# Patient Record
Sex: FEMALE | Race: WHITE | NOT HISPANIC OR LATINO | Employment: FULL TIME | URBAN - METROPOLITAN AREA
[De-identification: names, ages, dates, MRNs, and addresses within clinical notes are randomized per-mention and may not be internally consistent; named-entity substitution may affect disease eponyms.]

---

## 2022-04-29 ENCOUNTER — TRANSFERRED RECORDS (OUTPATIENT)
Dept: HEALTH INFORMATION MANAGEMENT | Facility: CLINIC | Age: 32
End: 2022-04-29
Payer: COMMERCIAL

## 2022-05-03 ENCOUNTER — TRANSFERRED RECORDS (OUTPATIENT)
Dept: HEALTH INFORMATION MANAGEMENT | Facility: CLINIC | Age: 32
End: 2022-05-03
Payer: COMMERCIAL

## 2022-05-06 ENCOUNTER — TRANSFERRED RECORDS (OUTPATIENT)
Dept: HEALTH INFORMATION MANAGEMENT | Facility: CLINIC | Age: 32
End: 2022-05-06
Payer: COMMERCIAL

## 2022-05-06 ENCOUNTER — MEDICAL CORRESPONDENCE (OUTPATIENT)
Dept: HEALTH INFORMATION MANAGEMENT | Facility: CLINIC | Age: 32
End: 2022-05-06
Payer: COMMERCIAL

## 2022-05-09 ENCOUNTER — TRANSCRIBE ORDERS (OUTPATIENT)
Dept: OTHER | Age: 32
End: 2022-05-09

## 2022-05-09 ENCOUNTER — TELEPHONE (OUTPATIENT)
Dept: ORTHOPEDICS | Facility: CLINIC | Age: 32
End: 2022-05-09
Payer: COMMERCIAL

## 2022-05-09 DIAGNOSIS — M79.621 PAIN OF RIGHT UPPER ARM: Primary | ICD-10-CM

## 2022-05-09 NOTE — TELEPHONE ENCOUNTER
[facility] Call Center    Phone Message    May a detailed message be left on voicemail: yes     Reason for Call: Appointment Intake    Referring Provider Name: Dr. Monge   Diagnosis and/or Symptoms: Right Bicep Mass - sarcoma vs lipoma    Pt being referred to Dr. Monge for right bicep mass, sarcoma vs lipoma from Britni Agustin PA-C, [facility].  Pt can be reached at [phone].    Action Taken: Message routed to:  [facility] ([facility] Orthopedics - Dr. Monge     Travel Screening: Not Applicable

## 2022-05-09 NOTE — TELEPHONE ENCOUNTER
5/17 30 min slot with Dr Alice POSADA   5/19 4:30pm Dr Jenkins   5/20 8:30am, 10:30am Gigi   5/23 8am, 10am Saleem  5/24 30 min appt slot with Dr Alice POSADA

## 2022-05-10 ENCOUNTER — TELEPHONE (OUTPATIENT)
Dept: OTHER | Age: 32
End: 2022-05-10
Payer: COMMERCIAL

## 2022-05-10 NOTE — TELEPHONE ENCOUNTER
Action May 10, 2022 11:50 AM ABT   Action Taken Image from [facility] received and resolved to PACS    2:41 PM  Records from [facility] received and sent to HIM for upload     DIAGNOSIS: Pain of right upper arm [M79.621]   APPOINTMENT DATE: 05/20/22   NOTES STATUS DETAILS   OFFICE NOTE from referring provider External: Sheeba 04/29/22: CHENCHO Liang   MEDICATION LIST External: [facility]    LABS     US PACS 05/03/22: US Upper Extremity Soft Tissue

## 2022-05-10 NOTE — TELEPHONE ENCOUNTER
Pt is already schedule in an appropriate time frame. No further action is needed at this time.     Ivette Ibanez ATC

## 2022-05-10 NOTE — TELEPHONE ENCOUNTER
Hello,  I'm with ortho rick.  Pt has an ortho oncology order.  Please review and find an appropriately timed appointment for this patient.  Thank you.

## 2022-05-20 ENCOUNTER — PRE VISIT (OUTPATIENT)
Dept: ORTHOPEDICS | Facility: CLINIC | Age: 32
End: 2022-05-20
Payer: COMMERCIAL

## 2022-05-20 ENCOUNTER — OFFICE VISIT (OUTPATIENT)
Dept: ORTHOPEDICS | Facility: CLINIC | Age: 32
End: 2022-05-20
Payer: COMMERCIAL

## 2022-05-20 VITALS — WEIGHT: 170 LBS | HEIGHT: 64 IN | BODY MASS INDEX: 29.02 KG/M2

## 2022-05-20 DIAGNOSIS — M79.621 PAIN OF RIGHT UPPER ARM: ICD-10-CM

## 2022-05-20 PROCEDURE — 99203 OFFICE O/P NEW LOW 30 MIN: CPT | Performed by: ORTHOPAEDIC SURGERY

## 2022-05-20 NOTE — LETTER
Date:May 20, 2022      Patient was self referred, no letter generated. Do not send.        [facility] Health Information

## 2022-05-20 NOTE — NURSING NOTE
"Reason For Visit:   Chief Complaint   Patient presents with     Consult     Right biceps mass referred by Britni Agustin PA-C at [facility] // first noticed about a month ago and more noticeable now // pt states that two days before noticing the mass, she was painting // she reports that she is currently pregnant       Ht 1.62 m (5' 3.78\")   Wt 77.1 kg (170 lb)   BMI 29.38 kg/m      Pain Assessment  Patient Currently in Pain: Denies (no pain today but can get up to 2)        Jona Tran ATC    "

## 2022-05-20 NOTE — PROGRESS NOTES
This is a 31-year-old woman has noticed right arm mass for a month or so.  Ultrasound was not helpful.  She has not noticed any growth since it was first detected.  She does not have pain in the spot.  She states that there is cancer in her family and she wanted to get it checked out.    Examination she is alert oriented has normal mood and affect and was in no acute distress.  Respirations are regular and unlabored eyes are nonicteric.  The mass is mobile with flexion of the underlying muscles.  Its not tender.  It seems to within the subcutaneous tissues and is about 2 cm in diameter or so.  It is on the medial aspect of the right upper arm.  There is no edema erythema or other masses that I can appreciate in that arm.    I reviewed the ultrasound images and report which were not helpful other than to define the size at about 2 cm.    Plan is to get an MRI.  If it is a lipoma then I would offer her to excise this the time of her choosing.  She is 6 weeks pregnant right now and so certainly could wait till after her pregnancy for her obviously benign tumor.  If it is unclear what it is then we should do a needle biopsy as soon as possible.  This is most safely done in clinic with local anesthesia.  We will give her more direction on a plan once we can see the MRI.  Usually we would get an MRI with contrast but if this is contraindicated with her stage of pregnancy then the MRI could be done without contrast.  I have answered all of her questions today.

## 2022-05-20 NOTE — LETTER
5/20/2022         RE: Mireille Yin  [address] [facility] [address]        Dear Colleague,    Thank you for referring your patient, Mireille Yin, to the [facility] [place]. Please see a copy of my visit note below.    This is a 31-year-old woman has noticed right arm mass for a month or so.  Ultrasound was not helpful.  She has not noticed any growth since it was first detected.  She does not have pain in the spot.  She states that there is cancer in her family and she wanted to get it checked out.    Examination she is alert oriented has normal mood and affect and was in no acute distress.  Respirations are regular and unlabored eyes are nonicteric.  The mass is mobile with flexion of the underlying muscles.  Its not tender.  It seems to within the subcutaneous tissues and is about 2 cm in diameter or so.  It is on the medial aspect of the right upper arm.  There is no edema erythema or other masses that I can appreciate in that arm.    I reviewed the ultrasound images and report which were not helpful other than to define the size at about 2 cm.    Plan is to get an MRI.  If it is a lipoma then I would offer her to excise this the time of her choosing.  She is 6 weeks pregnant right now and so certainly could wait till after her pregnancy for her obviously benign tumor.  If it is unclear what it is then we should do a needle biopsy as soon as possible.  This is most safely done in clinic with local anesthesia.  We will give her more direction on a plan once we can see the MRI.  Usually we would get an MRI with contrast but if this is contraindicated with her stage of pregnancy then the MRI could be done without contrast.  I have answered all of her questions today.      Again, thank you for allowing me to participate in the care of your patient.        Sincerely,        Brent Yu MD

## 2022-05-24 ENCOUNTER — ANCILLARY PROCEDURE (OUTPATIENT)
Dept: MRI IMAGING | Facility: CLINIC | Age: 32
End: 2022-05-24
Attending: ORTHOPAEDIC SURGERY
Payer: COMMERCIAL

## 2022-05-24 DIAGNOSIS — M79.621 PAIN OF RIGHT UPPER ARM: ICD-10-CM

## 2022-05-24 PROCEDURE — 73218 MRI UPPER EXTREMITY W/O DYE: CPT | Mod: RT | Performed by: RADIOLOGY

## 2022-06-01 ENCOUNTER — VIRTUAL VISIT (OUTPATIENT)
Dept: ORTHOPEDICS | Facility: CLINIC | Age: 32
End: 2022-06-01
Payer: COMMERCIAL

## 2022-06-01 DIAGNOSIS — M79.89 SOFT TISSUE MASS: Primary | ICD-10-CM

## 2022-06-01 PROCEDURE — 99213 OFFICE O/P EST LOW 20 MIN: CPT | Mod: TEL | Performed by: ORTHOPAEDIC SURGERY

## 2022-06-01 NOTE — LETTER
Date:June 2, 2022      Provider requested that no letter be sent. Do not send.       [facility]

## 2022-06-01 NOTE — NURSING NOTE
Reason For Visit:   Chief Complaint   Patient presents with     RECHECK     Review recent right humerus MRI       There were no vitals taken for this visit.    Pain Assessment  Patient Currently in Pain: Denies (no pain per pt)    Jona Tran ATC

## 2022-06-01 NOTE — PROGRESS NOTES
Mireille is a 31 year old who is being evaluated via a billable telephone visit.      What phone number would you like to be contacted at? [phone]  How would you like to obtain your AVS? Mail a copy    Phone call duration: 2 minutes    I called this patient today to report the results of her MRI of her forearm tumor.  We were hoping that this would be a lipoma and that she could essentially ignore it during her pregnancy.  Unfortunately the MRI shows a growth that does not have any obvious fat in it.  I am still hopeful that this could be something benign such as a nerve tumor but it the MRI is indeterminate.  I therefore have recommended that the patient come in for a needle biopsy.  This is the plan that we previously discussed.    Over the phone the patient was alert oriented and appropriate and in no acute distress.  She is in agreement with this plan and so we will reach out to her to set up a time and we can do needle biopsy under local anesthetic in clinic very soon.

## 2022-06-01 NOTE — LETTER
6/1/2022         RE: Mireille Yin  [address] [facility] [address]        Dear Colleague,    Thank you for referring your patient, Mireille Yin, to the [facility] [place]. Please see a copy of my visit note below.    Mireille is a 31 year old who is being evaluated via a billable telephone visit.      What phone number would you like to be contacted at? [phone]  How would you like to obtain your AVS? Mail a copy    Phone call duration: 2 minutes    I called this patient today to report the results of her MRI of her forearm tumor.  We were hoping that this would be a lipoma and that she could essentially ignore it during her pregnancy.  Unfortunately the MRI shows a growth that does not have any obvious fat in it.  I am still hopeful that this could be something benign such as a nerve tumor but it the MRI is indeterminate.  I therefore have recommended that the patient come in for a needle biopsy.  This is the plan that we previously discussed.    Over the phone the patient was alert oriented and appropriate and in no acute distress.  She is in agreement with this plan and so we will reach out to her to set up a time and we can do needle biopsy under local anesthetic in clinic very soon.        Again, thank you for allowing me to participate in the care of your patient.        Sincerely,        Brent Yu MD

## 2022-06-03 ENCOUNTER — OFFICE VISIT (OUTPATIENT)
Dept: ORTHOPEDICS | Facility: CLINIC | Age: 32
End: 2022-06-03
Payer: COMMERCIAL

## 2022-06-03 DIAGNOSIS — M79.89 SOFT TISSUE MASS: Primary | ICD-10-CM

## 2022-06-03 PROCEDURE — 88305 TISSUE EXAM BY PATHOLOGIST: CPT | Mod: TC | Performed by: ORTHOPAEDIC SURGERY

## 2022-06-03 PROCEDURE — 88305 TISSUE EXAM BY PATHOLOGIST: CPT | Mod: 26 | Performed by: PATHOLOGY

## 2022-06-03 PROCEDURE — 88342 IMHCHEM/IMCYTCHM 1ST ANTB: CPT | Mod: 26 | Performed by: PATHOLOGY

## 2022-06-03 PROCEDURE — 88368 INSITU HYBRIDIZATION MANUAL: CPT | Mod: 26 | Performed by: PATHOLOGY

## 2022-06-03 PROCEDURE — 88271 CYTOGENETICS DNA PROBE: CPT | Performed by: ORTHOPAEDIC SURGERY

## 2022-06-03 PROCEDURE — 88341 IMHCHEM/IMCYTCHM EA ADD ANTB: CPT | Mod: 26 | Performed by: PATHOLOGY

## 2022-06-03 PROCEDURE — 20206 BIOPSY MUSCLE PERQ NEEDLE: CPT | Mod: RT | Performed by: ORTHOPAEDIC SURGERY

## 2022-06-03 PROCEDURE — 99213 OFFICE O/P EST LOW 20 MIN: CPT | Mod: 25 | Performed by: ORTHOPAEDIC SURGERY

## 2022-06-03 NOTE — LETTER
6/3/2022         RE: Mireille Yin  [address] [facility] [address]        Dear Colleague,    Thank you for referring your patient, Mireille Yin, to the [facility] [place]. Please see a copy of my visit note below.    This patient has a right upper arm soft tissue mass and recently had an MRI.  The MRI demonstrated that the mass was not a lipoma.  The mass is sitting down on the biceps muscle just superficial to the fascia.  She presents today to have this biopsied.    On examination she is alert oriented has a normal mood and affect and is in no acute distress peer respirations are regular and unlabored eyes are nonicteric.  In the right upper extremity there is no edema or erythema and no skin lesions.  She has normal motion of the shoulder elbow wrist and hand.  In her right upper extremity there is a palpable nontender mass in the anteromedial aspect of the mid right upper arm.    Description of procedure:    Patient was placed supine and the right arm mass was marked.  The overlying skin was sterilely prepped.  I infiltrated skin subcutaneous tissue and pseudocapsule the tumor with lidocaine.  Once we had achieved local anesthetic then I made a small incision with an 11 blade and passed a Sid-Cut needle 3 times obtaining to good pieces of tissue.  After holding pressure for hemostasis the wound was dressed with Steri-Strips and a sterile bandage.  Patient tolerated this well was able to move her arm fully and ambulate comfortably afterward.    We will contact the patient next week with the results of the biopsy and any proposed treatment.      Again, thank you for allowing me to participate in the care of your patient.        Sincerely,        Brent Yu MD

## 2022-06-03 NOTE — NURSING NOTE
[facility] [address] [address] [phone]  Dept: [phone]  ______________________________________________________________________________    Patient: Mireille Yin   : 1990   MRN: [id number]   Jessie 3, 2022    INVASIVE PROCEDURE SAFETY CHECKLIST    Date: 6/3/2022   Procedure:Right Upperarm Biopsy  Patient Name: Mireille Yin  MRN: [id number]  YOB: 1990    Action: Complete sections as appropriate. Any discrepancy results in a HARD COPY until resolved.     PRE PROCEDURE:  Patient ID verified with 2 identifiers (name and  or MRN): Yes  Procedure and site verified with patient/designee (when able): Yes  Accurate consent documentation in medical record: Yes  H&P (or appropriate assessment) documented in medical record: NA  H&P must be up to 20 days prior to procedure and updates within 24 hours of procedure as applicable: NA  Relevant diagnostic and radiology test results appropriately labeled and displayed as applicable: Yes  Procedure site(s) marked with provider initials: NA    TIMEOUT:  Time-Out performed immediately prior to starting procedure, including verbal and active participation of all team members addressing the following:Yes  * Correct patient identify  * Confirmed that the correct side and site are marked  * An accurate procedure consent form  * Agreement on the procedure to be done  * Correct patient position  * Relevant images and results are properly labeled and appropriately displayed  * The need to administer antibiotics or fluids for irrigation purposes during the procedure as applicable   * Safety precautions based on patient history or medication use    DURING PROCEDURE: Verification of correct person, site, and procedures any time the responsibility for care of the patient is transferred to another member of the care team.       The following medications were given:         Prior to injection,  verified patient identity using patient's name and date of birth.  Due to injection administration, patient instructed to remain in clinic for 15 minutes  afterwards, and to report any adverse reaction to me immediately.    Biopsy Injection  Medication Name: Lidocaine NDC 94328-947-56  Drug Amount Wasted:  Yes: 11 mg/ml   Vial/Syringe: Single dose vial  Expiration Date:  1/1/26  Lot: [id number]        Scribed by Jeanne Tran ATC for Dr. Yu on Jessie 3, 2022 at 11:00 am based on the provider's statements to me.     Jeanne Tran ATC

## 2022-06-03 NOTE — NURSING NOTE
Reason For Visit:   Chief Complaint   Patient presents with     RECHECK     Right upper arm needle biopsy       There were no vitals taken for this visit.    Pain Assessment  Patient Currently in Pain: Denies (no pain per pt)    Jona Tran ATC

## 2022-06-03 NOTE — PROGRESS NOTES
This patient has a right upper arm soft tissue mass and recently had an MRI.  The MRI demonstrated that the mass was not a lipoma.  The mass is sitting down on the biceps muscle just superficial to the fascia.  She presents today to have this biopsied.    On examination she is alert oriented has a normal mood and affect and is in no acute distress peer respirations are regular and unlabored eyes are nonicteric.  In the right upper extremity there is no edema or erythema and no skin lesions.  She has normal motion of the shoulder elbow wrist and hand.  In her right upper extremity there is a palpable nontender mass in the anteromedial aspect of the mid right upper arm.    Description of procedure:    Patient was placed supine and the right arm mass was marked.  The overlying skin was sterilely prepped.  I infiltrated skin subcutaneous tissue and pseudocapsule the tumor with lidocaine.  Once we had achieved local anesthetic then I made a small incision with an 11 blade and passed a Sid-Cut needle 3 times obtaining to good pieces of tissue.  After holding pressure for hemostasis the wound was dressed with Steri-Strips and a sterile bandage.  Patient tolerated this well was able to move her arm fully and ambulate comfortably afterward.    We will contact the patient next week with the results of the biopsy and any proposed treatment.

## 2022-06-03 NOTE — LETTER
Date:Jessie 3, 2022      Provider requested that no letter be sent. Do not send.       [facility]

## 2022-06-10 ENCOUNTER — MYC MEDICAL ADVICE (OUTPATIENT)
Dept: ORTHOPEDICS | Facility: CLINIC | Age: 32
End: 2022-06-10
Payer: COMMERCIAL

## 2022-06-10 LAB — INTERPRETATION: NORMAL

## 2022-06-10 NOTE — TELEPHONE ENCOUNTER
Called the patient to let her know we do not have final pathology results.  Her specimen is being sent out for additional testing.  They are favoring nodular fasciitis.  We will call her again next week with results.  All questions answered.

## 2022-06-17 LAB
PATH REPORT.ADDENDUM SPEC: NORMAL
PATH REPORT.COMMENTS IMP SPEC: NORMAL
PATH REPORT.FINAL DX SPEC: NORMAL
PATH REPORT.GROSS SPEC: NORMAL
PATH REPORT.MICROSCOPIC SPEC OTHER STN: NORMAL
PATH REPORT.RELEVANT HX SPEC: NORMAL
PHOTO IMAGE: NORMAL

## 2022-06-22 DIAGNOSIS — M79.89 SOFT TISSUE MASS: Primary | ICD-10-CM

## 2022-06-24 ENCOUNTER — TELEPHONE (OUTPATIENT)
Dept: ORTHOPEDICS | Facility: CLINIC | Age: 32
End: 2022-06-24

## 2022-06-24 ENCOUNTER — VIRTUAL VISIT (OUTPATIENT)
Dept: SURGERY | Facility: CLINIC | Age: 32
End: 2022-06-24
Payer: COMMERCIAL

## 2022-06-24 ENCOUNTER — ANESTHESIA EVENT (OUTPATIENT)
Dept: SURGERY | Facility: CLINIC | Age: 32
End: 2022-06-24
Payer: COMMERCIAL

## 2022-06-24 DIAGNOSIS — M79.89 SOFT TISSUE MASS: ICD-10-CM

## 2022-06-24 DIAGNOSIS — Z01.818 PREOP EXAMINATION: Primary | ICD-10-CM

## 2022-06-24 PROCEDURE — 99204 OFFICE O/P NEW MOD 45 MIN: CPT | Mod: GT | Performed by: CLINICAL NURSE SPECIALIST

## 2022-06-24 ASSESSMENT — PAIN SCALES - GENERAL: PAINLEVEL: NO PAIN (0)

## 2022-06-24 NOTE — TELEPHONE ENCOUNTER
RN called and spoke with Mireille. She is scheduled for surgery on Monday with Dr. Yu for a wide resection right forearm mass.  She will have a virtual PAC for H&P today.  They may be able to tell her arrival time and her eating and drinking restrictions.  RN reviewed surgery packet and she will have her family drive her home and take care of her after her same day surgery.  RN emailed the surgery packet to her.

## 2022-06-24 NOTE — H&P
Pre-Operative H & P     CC:  Preoperative exam to assess for increased cardiopulmonary risk while undergoing surgery and anesthesia.    Date of Encounter: 6/24/2022  Primary Care Physician:  No primary care provider on file.     Reason for visit:   Encounter Diagnoses   Name Primary?     Preop examination Yes     Soft tissue mass        ADAMS Yin is a 31 year old female who presents for pre-operative H & P in preparation for  Procedure Information     Case: [id number] Date/Time: 06/27/22 1510    Procedure: Wide resection right forearm mass (Right Arm)    Anesthesia type: General    Diagnosis: Soft tissue mass [M79.89]    Pre-op diagnosis: Soft tissue mass [M79.89]    Location: [facility] / [facility]    Providers: Brent Yu MD        History is obtained from the patient and chart review    Hx of abnormal bleeding or anti-platelet use: Denies.     Patient who was recently evaluated by Dr. Yu for a right arm mass present for the past 1-2 months. It was not painful and did not seem to enlarge. She had an US which was not helpful, so she was referred for MRI. This showed that the mass was not a lipoma and that it was sitting down on the biceps muscle just superficial to the fascia. A biopsy was then performed in clinic. It revealed low-grade spindle cell neoplasm.     She was then counseled for above surgical resection.     She is otherwise healthy.     Menstrual history: Patient was approximately six weeks pregnant when she first was evaluated by Dr. Yu. She reports today that she had a miscarriage 2 weeks and one day ago. She did receive care and reports taking misoprostol. She denies bleeding.     Past Medical History  Past Medical History:   Diagnosis Date     Fracture of right lower leg     as child     Soft tissue mass        Past Surgical History  Past Surgical History:   Procedure Laterality Date     [place] teeth extraction          Prior to Admission Medications  No current outpatient  medications on file.       Allergies  No Known Allergies    Social History  Social History     Socioeconomic History     Marital status:      Spouse name: Not on file     Number of children: Not on file     Years of education: Not on file     Highest education level: Not on file   Occupational History     Not on file   Tobacco Use     Smoking status: Never Smoker     Smokeless tobacco: Never Used   Substance and Sexual Activity     Alcohol use: Yes     Comment: occ     Drug use: Not Currently     Sexual activity: Not on file   Other Topics Concern     Not on file   Social History Narrative     Not on file     Social Determinants of Health     Financial Resource Strain: Not on file   Food Insecurity: Not on file   Transportation Needs: Not on file   Physical Activity: Not on file   Stress: Not on file   Social Connections: Not on file   Intimate Partner Violence: Not on file   Housing Stability: Not on file       Family History  Family History   Problem Relation Age of Onset     Pancreatic Cancer Mother      Lung Cancer Father      Hypertension Father      Diabetes Maternal Grandmother      Anesthesia Reaction No family hx of      Deep Vein Thrombosis (DVT) No family hx of        Review of Systems  The complete review of systems is negative other than noted in the HPI or here.   Anesthesia Evaluation   Pt has had prior anesthetic. Type: MAC.    No history of anesthetic complications       ROS/MED HX  ENT/Pulmonary:  - neg pulmonary ROS     Neurologic:  - neg neurologic ROS     Cardiovascular:     (+) -----No previous cardiac testing  (-) taking anticoagulants/antiplatelets   METS/Exercise Tolerance: >4 METS    Hematologic:  - neg hematologic  ROS     Musculoskeletal: Comment: Soft tissue mass right forearm.  History of right leg fracture as child.       GI/Hepatic:    (-) GERD   Renal/Genitourinary:  - neg Renal ROS     Endo:  - neg endo ROS     Psychiatric/Substance Use:  - neg psychiatric ROS     Infectious  Disease:  - neg infectious disease ROS     Malignancy:  - neg malignancy ROS     Other: Comment: Patient was six weeks pregnant when she saw Dr. Yu. Today reported miscarriage 2 weeks, 1 day ago. Took misoprostol. Denies bleeding.            Virtual visit -  No vitals were obtained    Physical Exam  Constitutional: Awake, alert, no apparent distress, and appears stated age.  HENT: Normocephalic  Respiratory: non labored breathing; no cough.    Neurologic: Oriented to name, place and time.   Neuropsychiatric: Calm, cooperative. Normal affect.      Prior Labs/Diagnostic Studies   All labs and imaging personally reviewed     Labs not indicated    EKG: Not indicated    MR Humerus 5/24/22  Impression:     1. Well-circumscribed subcutaneous lesion abutting the investing  fascia overlying the biceps muscle belly at the mid arm measuring up  to 2.2 cm. Contrast not given as it is contraindicated in pregnancy  however vascularity was demonstrated on recent ultrasound.  Considerations include nerve sheath tumor or vascular lesion.     2. No osseous or gross joint pathology.    The patient's records and results personally reviewed by this provider.     Outside records reviewed from: No outside records available      Assessment      Mireille Yin is a 31 year old female seen as a PAC referral for risk assessment and optimization for anesthesia.    Plan/Recommendations  Pt will be optimized for the proposed procedure.  See below for details on the assessment, risk, and preoperative recommendations    NEUROLOGY  - No history of TIA, CVA or seizure  -Post Op delirium risk factors:  No risk identified    ENT  - No current airway concerns.  Will need to be reassessed day of surgery.  Mallampati: Unable to assess  TM: Unable to assess    CARDIAC  No cardiac history, symptoms or meds.   - METS (Metabolic Equivalents)>4    RCRI: 0.4% risk of serious cardiac events    PULMONARY  Denies cough or shortness of breath  No asthma  Low  "risk for KISHA    - Tobacco History      History   Smoking Status     Never Smoker   Smokeless Tobacco     Never Used       GI: Denies GERD.  PONV Medium Risk  Total Score: 2           1 AN PONV: Pt is Female    1 AN PONV: Patient is not a current smoker        ENDOCRINE    - BMI: Estimated body mass index is 29.38 kg/m  as calculated from the following:    Height as of 5/20/22: 1.62 m (5' 3.78\").    Weight as of 5/20/22: 77.1 kg (170 lb).  Overweight (BMI 25.0-29.9)  - No history of Diabetes Mellitus    HEME  VTE Low Risk 0.26%            Total Score: 0      No history of blood transfusion.     MSK: History of right leg fracture as child.     The patient is optimized for their procedure. AVS with information on surgery time/arrival time, meds and NPO status given by nursing staff. No further diagnostic testing indicated.    Please refer to the physical examination documented by the anesthesiologist in the anesthesia record on the day of surgery.    Video-Visit Details    Type of service:  Video Visit    Patient verbally consented to video service today: YES    Video Start Time: 3:00pm   Video End Time (time video stopped): 3:10pm     Originating Location (pt. Location): Home    Distant Location (provider location):  [facility] PREOPERAT[facility]ESS[facility]     Mode of Communication:  Video Conference via [employer] Mobile  On the day of service:     Prep time: 20 minutes  Visit time: 10 minutes  Documentation time: 20 minutes  ------------------------------------------  Total time: 50 minutes      KODY Bowden CNS  Preoperative Assessment Center  [facility]  Clinic and Surgery Center  Phone: [phone]  Fax: [phone]  "

## 2022-06-24 NOTE — H&P (VIEW-ONLY)
Pre-Operative H & P     CC:  Preoperative exam to assess for increased cardiopulmonary risk while undergoing surgery and anesthesia.    Date of Encounter: 6/24/2022  Primary Care Physician:  No primary care provider on file.     Reason for visit:   Encounter Diagnoses   Name Primary?     Preop examination Yes     Soft tissue mass        ADAMS Yin is a 31 year old female who presents for pre-operative H & P in preparation for  Procedure Information     Case: [id number] Date/Time: 06/27/22 1510    Procedure: Wide resection right forearm mass (Right Arm)    Anesthesia type: General    Diagnosis: Soft tissue mass [M79.89]    Pre-op diagnosis: Soft tissue mass [M79.89]    [facility]    Providers: Brent Yu MD        History is obtained from the patient and chart review    Hx of abnormal bleeding or anti-platelet use: Denies.     Patient who was recently evaluated by Dr. Yu for a right arm mass present for the past 1-2 months. It was not painful and did not seem to enlarge. She had an US which was not helpful, so she was referred for MRI. This showed that the mass was not a lipoma and that it was sitting down on the biceps muscle just superficial to the fascia. A biopsy was then performed in clinic. It revealed low-grade spindle cell neoplasm.     She was then counseled for above surgical resection.     She is otherwise healthy.     Menstrual history: Patient was approximately six weeks pregnant when she first was evaluated by Dr. Yu. She reports today that she had a miscarriage 2 weeks and one day ago. She did receive care and reports taking misoprostol. She denies bleeding.     Past Medical History  Past Medical History:   Diagnosis Date     Fracture of right lower leg     as child     Soft tissue mass        Past Surgical History  Past Surgical History:   Procedure Laterality Date     [place] teeth extraction          Prior to Admission Medications  No current outpatient  medications on file.       Allergies  No Known Allergies    Social History  Social History     Socioeconomic History     Marital status:      Spouse name: Not on file     Number of children: Not on file     Years of education: Not on file     Highest education level: Not on file   Occupational History     Not on file   Tobacco Use     Smoking status: Never Smoker     Smokeless tobacco: Never Used   Substance and Sexual Activity     Alcohol use: Yes     Comment: occ     Drug use: Not Currently     Sexual activity: Not on file   Other Topics Concern     Not on file   Social History Narrative     Not on file     Social Determinants of Health     Financial Resource Strain: Not on file   Food Insecurity: Not on file   Transportation Needs: Not on file   Physical Activity: Not on file   Stress: Not on file   Social Connections: Not on file   Intimate Partner Violence: Not on file   Housing Stability: Not on file       Family History  Family History   Problem Relation Age of Onset     Pancreatic Cancer Mother      Lung Cancer Father      Hypertension Father      Diabetes Maternal Grandmother      Anesthesia Reaction No family hx of      Deep Vein Thrombosis (DVT) No family hx of        Review of Systems  The complete review of systems is negative other than noted in the HPI or here.   Anesthesia Evaluation   Pt has had prior anesthetic. Type: MAC.    No history of anesthetic complications       ROS/MED HX  ENT/Pulmonary:  - neg pulmonary ROS     Neurologic:  - neg neurologic ROS     Cardiovascular:     (+) -----No previous cardiac testing  (-) taking anticoagulants/antiplatelets   METS/Exercise Tolerance: >4 METS    Hematologic:  - neg hematologic  ROS     Musculoskeletal: Comment: Soft tissue mass right forearm.  History of right leg fracture as child.       GI/Hepatic:    (-) GERD   Renal/Genitourinary:  - neg Renal ROS     Endo:  - neg endo ROS     Psychiatric/Substance Use:  - neg psychiatric ROS     Infectious  Disease:  - neg infectious disease ROS     Malignancy:  - neg malignancy ROS     Other: Comment: Patient was six weeks pregnant when she saw Dr. Yu. Today reported miscarriage 2 weeks, 1 day ago. Took misoprostol. Denies bleeding.            Virtual visit -  No vitals were obtained    Physical Exam  Constitutional: Awake, alert, no apparent distress, and appears stated age.  HENT: Normocephalic  Respiratory: non labored breathing; no cough.    Neurologic: Oriented to name, place and time.   Neuropsychiatric: Calm, cooperative. Normal affect.      Prior Labs/Diagnostic Studies   All labs and imaging personally reviewed     Labs not indicated    EKG: Not indicated    MR Humerus 5/24/22  Impression:     1. Well-circumscribed subcutaneous lesion abutting the investing  fascia overlying the biceps muscle belly at the mid arm measuring up  to 2.2 cm. Contrast not given as it is contraindicated in pregnancy  however vascularity was demonstrated on recent ultrasound.  Considerations include nerve sheath tumor or vascular lesion.     2. No osseous or gross joint pathology.    The patient's records and results personally reviewed by this provider.     Outside records reviewed from: No outside records available      Assessment      Mireille Yin is a 31 year old female seen as a PAC referral for risk assessment and optimization for anesthesia.    Plan/Recommendations  Pt will be optimized for the proposed procedure.  See below for details on the assessment, risk, and preoperative recommendations    NEUROLOGY  - No history of TIA, CVA or seizure  -Post Op delirium risk factors:  No risk identified    ENT  - No current airway concerns.  Will need to be reassessed day of surgery.  Mallampati: Unable to assess  TM: Unable to assess    CARDIAC  No cardiac history, symptoms or meds.   - METS (Metabolic Equivalents)>4    RCRI: 0.4% risk of serious cardiac events    PULMONARY  Denies cough or shortness of breath  No asthma  Low  "risk for KISHA    - Tobacco History      History   Smoking Status     Never Smoker   Smokeless Tobacco     Never Used       GI: Denies GERD.  PONV Medium Risk  Total Score: 2           1 AN PONV: Pt is Female    1 AN PONV: Patient is not a current smoker        ENDOCRINE    - BMI: Estimated body mass index is 29.38 kg/m  as calculated from the following:    Height as of 5/20/22: 1.62 m (5' 3.78\").    Weight as of 5/20/22: 77.1 kg (170 lb).  Overweight (BMI 25.0-29.9)  - No history of Diabetes Mellitus    HEME  VTE Low Risk 0.26%            Total Score: 0      No history of blood transfusion.     MSK: History of right leg fracture as child.     The patient is optimized for their procedure. AVS with information on surgery time/arrival time, meds and NPO status given by nursing staff. No further diagnostic testing indicated.    Please refer to the physical examination documented by the anesthesiologist in the anesthesia record on the day of surgery.    Video-Visit Details    Type of service:  Video Visit    Patient verbally consented to video service today: YES    Video Start Time: 3:00pm   Video End Time (time video stopped): 3:10pm     Originating Location (pt. Location): Home    Distant Location (provider location):  [facility] PREOPERATIVE ASSESSMENT CENTER     Mode of Communication:  Video Conference via [employer]  On the day of service:     Prep time: 20 minutes  Visit time: 10 minutes  Documentation time: 20 minutes  ------------------------------------------  Total time: 50 minutes      KODY Bowden CNS  Preoperative Assessment Center  [facility]  Clinic and Surgery Center  Phone: [phone]  Fax: [phone]  "

## 2022-06-24 NOTE — PATIENT INSTRUCTIONS
Preparing for Your Surgery      Name:  Mireille Yin   MRN:  [id number]   :  1990   Today's Date:  2022       Arriving for surgery:  Surgery date:  22  Arrival time:  12:50 pm    Restrictions due to COVID 19       Effective 22 [facility] is implementing the following visitor policy:     1 person may accompany the patient through the Pre-Op process.      That same person may wait in the Surgery Waiting room, provided there is enough room to social distance         Inpatients are allowed 2 visitors per day for the duration of their stay.        Visitors must wear a mask.      Visitors must not be ill.      Visiting hours are 8 am to 8 pm.     parking is available for anyone with mobility limitations or disabilities.  ([place]  24 hours/ 7 days a week; [facility]  7 am- 3:30 pm, Mon- Fri)    Please come to:     [facility] [address]  [facility]    [address] [place]  [address]    -Parking is available in the Green lot.    -Proceed to the 3rd floor, check in at the Adult Surgery Waiting Lounge. [phone]    If an escort is needed stop at the Information Desk in the lobby. Inform the information person that you are here for surgery.       What can I eat or drink?  -  You may eat and drink normally for up to 8 hours before your surgery. (Until 7:10am on 22)  -  You may have clear liquids until 2 hours before surgery. (Until 12:50pm on 22)    Examples of clear liquids:  Water  Clear broth  Juices (apple, white grape, white cranberry  and cider) without pulp  Noncarbonated, powder based beverages  (lemonade and Quang-Aid)  Sodas (Sprite, 7-Up, ginger ale and seltzer)  Coffee or tea (without milk or cream)  Gatorade    -  No Alcohol for at least 24 hours before surgery     Which medicines can I take?      Hold Multivitamins for 7 days before surgery. Starting today is fine    Hold Ibuprofen  (Advil, Motrin) for 1 day before surgery--unless otherwise directed by surgeon.  Hold Naproxen (Aleve) for 4 days before surgery.        How do I prepare myself?  - Please take 2 showers before surgery using Scrubcare or Hibiclens soap.    Use this soap only from the neck to your toes.     Leave the soap on your skin for one minute--then rinse thoroughly.      You may use your own shampoo and conditioner; no other hair products.   - Please remove all jewelry and body piercings.  - No lotions, deodorants or fragrance.  - No makeup or fingernail polish.   - Bring your ID and insurance card.    -If you have a Deep Brain Stimulator, Spinal Cord Stimulator or any neuro stimulator device---you must bring the remote control to the hospital         ALL PATIENTS GOING HOME THE SAME DAY OF SURGERY ARE REQUIRED TO HAVE A RESPONSIBLE ADULT TO DRIVE AND BE IN ATTENDANCE WITH THEM FOR 24 HOURS FOLLOWING SURGERY.       Questions or Concerns:    - For any questions regarding the day of surgery or your hospital stay, please contact the Pre Admission Nursing Office at [phone].       - If you have health changes between today and your surgery please call your surgeon.       For questions after surgery please call your surgeons office.

## 2022-06-24 NOTE — PROGRESS NOTES
Mireille is a 31 year old who is being evaluated via a billable video visit.      How would you like to obtain your AVS? MyChart  If the video visit is dropped, the invitation should be resent by: Text to cell phone: [phone]     HPI       Review of Systems         Objective    Vitals - Patient Reported  Pain Score: No Pain (0)        Physical Exam     .  ..

## 2022-06-26 NOTE — ANESTHESIA PREPROCEDURE EVALUATION
Pre-Operative H & P     CC:  Preoperative exam to assess for increased cardiopulmonary risk while undergoing surgery and anesthesia.    Date of Encounter: 6/24/2022  Primary Care Physician:  No primary care provider on file.     Reason for visit:   No diagnosis found.    ADAMS Yin is a 31 year old female who presents for pre-operative H & P in preparation for  Procedure Information     Case: [id number] Date/Time: 06/27/22 1510    Procedure: Wide resection right forearm mass (Right Arm)    Anesthesia type: General    Diagnosis: Soft tissue mass [M79.89]    Pre-op diagnosis: Soft tissue mass [M79.89]    [facility]    Providers: Brent Yu MD        History is obtained from the patient and chart review    Hx of abnormal bleeding or anti-platelet use: Denies.     Patient who was recently evaluated by Dr. Yu for a right arm mass present for the past 1-2 months. It was not painful and did not seem to enlarge. She had an US which was not helpful, so she was referred for MRI. This showed that the mass was not a lipoma and that it was sitting down on the biceps muscle just superficial to the fascia. A biopsy was then performed in clinic. It revealed low-grade spindle cell neoplasm.     She was then counseled for above surgical resection.     She is otherwise healthy.     Menstrual history: Patient was approximately six weeks pregnant when she first was evaluated by Dr. Yu. She reports today that she had a miscarriage 2 weeks and one day ago. She did receive care and reports taking misoprostol. She denies bleeding.     Past Medical History  Past Medical History:   Diagnosis Date     Fracture of right lower leg     as child     Soft tissue mass        Past Surgical History  Past Surgical History:   Procedure Laterality Date     [place] teeth extraction          Prior to Admission Medications  No current outpatient medications on file.       Allergies  No Known Allergies    Social  History  Social History     Socioeconomic History     Marital status:      Spouse name: Not on file     Number of children: Not on file     Years of education: Not on file     Highest education level: Not on file   Occupational History     Not on file   Tobacco Use     Smoking status: Never Smoker     Smokeless tobacco: Never Used   Substance and Sexual Activity     Alcohol use: Yes     Comment: occ     Drug use: Not Currently     Sexual activity: Not on file   Other Topics Concern     Not on file   Social History Narrative     Not on file     Social Determinants of Health     Financial Resource Strain: Not on file   Food Insecurity: Not on file   Transportation Needs: Not on file   Physical Activity: Not on file   Stress: Not on file   Social Connections: Not on file   Intimate Partner Violence: Not on file   Housing Stability: Not on file       Family History  Family History   Problem Relation Age of Onset     Pancreatic Cancer Mother      Lung Cancer Father      Hypertension Father      Diabetes Maternal Grandmother      Anesthesia Reaction No family hx of      Deep Vein Thrombosis (DVT) No family hx of        Review of Systems  The complete review of systems is negative other than noted in the HPI or here.   Anesthesia Evaluation   Pt has had prior anesthetic. Type: MAC.    No history of anesthetic complications       ROS/MED HX  ENT/Pulmonary:  - neg pulmonary ROS     Neurologic:  - neg neurologic ROS     Cardiovascular:     (+) -----No previous cardiac testing  (-) taking anticoagulants/antiplatelets   METS/Exercise Tolerance: >4 METS    Hematologic:  - neg hematologic  ROS     Musculoskeletal: Comment: Soft tissue mass right forearm.  History of right leg fracture as child.       GI/Hepatic:    (-) GERD   Renal/Genitourinary:  - neg Renal ROS     Endo:  - neg endo ROS     Psychiatric/Substance Use:  - neg psychiatric ROS     Infectious Disease:  - neg infectious disease ROS     Malignancy:  - neg  malignancy ROS     Other: Comment: Patient was six weeks pregnant when she saw Dr. Yu. Today reported miscarriage 2 weeks, 1 day ago. Took misoprostol. Denies bleeding.            Virtual visit -  No vitals were obtained    Physical Exam  Constitutional: Awake, alert, no apparent distress, and appears stated age.  HENT: Normocephalic  Respiratory: non labored breathing; no cough.    Neurologic: Oriented to name, place and time.   Neuropsychiatric: Calm, cooperative. Normal affect.      Prior Labs/Diagnostic Studies   All labs and imaging personally reviewed     Labs not indicated    EKG: Not indicated    MR Humerus 5/24/22  Impression:     1. Well-circumscribed subcutaneous lesion abutting the investing  fascia overlying the biceps muscle belly at the mid arm measuring up  to 2.2 cm. Contrast not given as it is contraindicated in pregnancy  however vascularity was demonstrated on recent ultrasound.  Considerations include nerve sheath tumor or vascular lesion.     2. No osseous or gross joint pathology.    The patient's records and results personally reviewed by this provider.     Outside records reviewed from: No outside records available      Assessment      Mireille Yin is a 31 year old female seen as a PAC referral for risk assessment and optimization for anesthesia.    Plan/Recommendations  Pt will be optimized for the proposed procedure.  See below for details on the assessment, risk, and preoperative recommendations    NEUROLOGY  - No history of TIA, CVA or seizure  -Post Op delirium risk factors:  No risk identified    ENT  - No current airway concerns.  Will need to be reassessed day of surgery.  Mallampati: Unable to assess  TM: Unable to assess    CARDIAC  No cardiac history, symptoms or meds.   - METS (Metabolic Equivalents)>4    RCRI: 0.4% risk of serious cardiac events    PULMONARY  Denies cough or shortness of breath  No asthma  Low risk for KISHA    - Tobacco History      History   Smoking Status  "    Never Smoker   Smokeless Tobacco     Never Used       GI: Denies GERD.  PONV Medium Risk  Total Score: 2           1 AN PONV: Pt is Female    1 AN PONV: Patient is not a current smoker        ENDOCRINE    - BMI: Estimated body mass index is 29.38 kg/m  as calculated from the following:    Height as of 5/20/22: 1.62 m (5' 3.78\").    Weight as of 5/20/22: 77.1 kg (170 lb).  Overweight (BMI 25.0-29.9)  - No history of Diabetes Mellitus    HEME  VTE Low Risk 0.26%            Total Score: 0      No history of blood transfusion.     MSK: History of right leg fracture as child.     The patient is optimized for their procedure. AVS with information on surgery time/arrival time, meds and NPO status given by nursing staff. No further diagnostic testing indicated.    Please refer to the physical examination documented by the anesthesiologist in the anesthesia record on the day of surgery.    Video-Visit Details    Type of service:  Video Visit    Patient verbally consented to video service today: YES    Video Start Time: 3:00pm   Video End Time (time video stopped): 3:10pm     Originating Location (pt. Location): Home    Distant Location (provider location):  [facility] PREOPERATIVE ASSESSMENT C[facility]     Mode of Communication:  Video Conference via [email or web address]  On the day of service:     Prep time: 20 minutes  Visit time: 10 minutes  Documentation time: 20 minutes  ------------------------------------------  Total time: 50 minutes      KODY Bowden CNS  Preoperative Assessment Center  [facility]  Clinic and Surgery Center  Phone: [phone]  Fax: [phone]    Physical Exam    Airway  airway exam normal      Mallampati: I   TM distance: > 3 FB   Neck ROM: full   Mouth opening: > 3 cm    Respiratory Devices and Support         Dental  no notable dental history         Cardiovascular   cardiovascular exam normal       Rhythm and rate: regular and normal     Pulmonary   pulmonary exam normal     "    breath sounds clear to auscultation             Anesthesia Plan    ASA Status:  3   NPO Status:  NPO Appropriate    Anesthesia Type: General.     - Airway: LMA   Induction: Intravenous, Propofol.   Maintenance: Balanced.        Consents            Postoperative Care    Pain management: IV analgesics, Oral pain medications, Multi-modal analgesia.   PONV prophylaxis: Ondansetron (or other 5HT-3), Dexamethasone or Solumedrol     Comments:

## 2022-06-27 ENCOUNTER — HOSPITAL ENCOUNTER (OUTPATIENT)
Facility: CLINIC | Age: 32
Discharge: HOME | End: 2022-06-27
Attending: ORTHOPAEDIC SURGERY | Admitting: ORTHOPAEDIC SURGERY
Payer: COMMERCIAL

## 2022-06-27 ENCOUNTER — ANESTHESIA (OUTPATIENT)
Dept: SURGERY | Facility: CLINIC | Age: 32
End: 2022-06-27
Payer: COMMERCIAL

## 2022-06-27 VITALS
OXYGEN SATURATION: 98 % | HEIGHT: 64 IN | BODY MASS INDEX: 28.42 KG/M2 | TEMPERATURE: 97.7 F | WEIGHT: 166.45 LBS | HEART RATE: 72 BPM | SYSTOLIC BLOOD PRESSURE: 103 MMHG | RESPIRATION RATE: 16 BRPM | DIASTOLIC BLOOD PRESSURE: 75 MMHG

## 2022-06-27 DIAGNOSIS — R22.31 MASS OF RIGHT FOREARM: Primary | ICD-10-CM

## 2022-06-27 LAB — GLUCOSE BLDC GLUCOMTR-MCNC: 90 MG/DL (ref 70–99)

## 2022-06-27 PROCEDURE — 250N000011 HC RX IP 250 OP 636: Performed by: NURSE ANESTHETIST, CERTIFIED REGISTERED

## 2022-06-27 PROCEDURE — 999N000141 HC STATISTIC PRE-PROCEDURE NURSING ASSESSMENT: Performed by: ORTHOPAEDIC SURGERY

## 2022-06-27 PROCEDURE — 370N000017 HC ANESTHESIA TECHNICAL FEE, PER MIN: Performed by: ORTHOPAEDIC SURGERY

## 2022-06-27 PROCEDURE — 88342 IMHCHEM/IMCYTCHM 1ST ANTB: CPT | Mod: TC | Performed by: ORTHOPAEDIC SURGERY

## 2022-06-27 PROCEDURE — 250N000011 HC RX IP 250 OP 636: Performed by: ORTHOPAEDIC SURGERY

## 2022-06-27 PROCEDURE — 360N000075 HC SURGERY LEVEL 2, PER MIN: Performed by: ORTHOPAEDIC SURGERY

## 2022-06-27 PROCEDURE — 710N000012 HC RECOVERY PHASE 2, PER MINUTE: Performed by: ORTHOPAEDIC SURGERY

## 2022-06-27 PROCEDURE — 250N000013 HC RX MED GY IP 250 OP 250 PS 637

## 2022-06-27 PROCEDURE — 250N000011 HC RX IP 250 OP 636

## 2022-06-27 PROCEDURE — 82962 GLUCOSE BLOOD TEST: CPT

## 2022-06-27 PROCEDURE — 250N000009 HC RX 250: Performed by: NURSE ANESTHETIST, CERTIFIED REGISTERED

## 2022-06-27 PROCEDURE — 272N000001 HC OR GENERAL SUPPLY STERILE: Performed by: ORTHOPAEDIC SURGERY

## 2022-06-27 PROCEDURE — 258N000003 HC RX IP 258 OP 636: Performed by: NURSE ANESTHETIST, CERTIFIED REGISTERED

## 2022-06-27 PROCEDURE — 710N000010 HC RECOVERY PHASE 1, LEVEL 2, PER MIN: Performed by: ORTHOPAEDIC SURGERY

## 2022-06-27 PROCEDURE — 250N000025 HC SEVOFLURANE, PER MIN: Performed by: ORTHOPAEDIC SURGERY

## 2022-06-27 PROCEDURE — 88307 TISSUE EXAM BY PATHOLOGIST: CPT | Mod: TC | Performed by: ORTHOPAEDIC SURGERY

## 2022-06-27 PROCEDURE — 24076 EX ARM/ELBOW TUM DEEP < 5 CM: CPT | Mod: RT | Performed by: ORTHOPAEDIC SURGERY

## 2022-06-27 RX ORDER — DEXAMETHASONE SODIUM PHOSPHATE 4 MG/ML
INJECTION, SOLUTION INTRA-ARTICULAR; INTRALESIONAL; INTRAMUSCULAR; INTRAVENOUS; SOFT TISSUE PRN
Status: DISCONTINUED | OUTPATIENT
Start: 2022-06-27 | End: 2022-06-27

## 2022-06-27 RX ORDER — OXYCODONE HYDROCHLORIDE 5 MG/1
5 TABLET ORAL EVERY 4 HOURS PRN
Status: DISCONTINUED | OUTPATIENT
Start: 2022-06-27 | End: 2022-06-27 | Stop reason: HOSPADM

## 2022-06-27 RX ORDER — ONDANSETRON 2 MG/ML
4 INJECTION INTRAMUSCULAR; INTRAVENOUS EVERY 30 MIN PRN
Status: DISCONTINUED | OUTPATIENT
Start: 2022-06-27 | End: 2022-06-27 | Stop reason: HOSPADM

## 2022-06-27 RX ORDER — BUPIVACAINE HYDROCHLORIDE 2.5 MG/ML
INJECTION, SOLUTION INFILTRATION; PERINEURAL PRN
Status: DISCONTINUED | OUTPATIENT
Start: 2022-06-27 | End: 2022-06-27 | Stop reason: HOSPADM

## 2022-06-27 RX ORDER — OXYCODONE HYDROCHLORIDE 5 MG/1
5 TABLET ORAL
Status: DISCONTINUED | OUTPATIENT
Start: 2022-06-27 | End: 2022-06-27 | Stop reason: HOSPADM

## 2022-06-27 RX ORDER — AMOXICILLIN 250 MG
1-2 CAPSULE ORAL 2 TIMES DAILY
Qty: 30 TABLET | Refills: 0 | Status: SHIPPED | OUTPATIENT
Start: 2022-06-27

## 2022-06-27 RX ORDER — LIDOCAINE 40 MG/G
CREAM TOPICAL
Status: DISCONTINUED | OUTPATIENT
Start: 2022-06-27 | End: 2022-06-27 | Stop reason: HOSPADM

## 2022-06-27 RX ORDER — PROPOFOL 10 MG/ML
INJECTION, EMULSION INTRAVENOUS PRN
Status: DISCONTINUED | OUTPATIENT
Start: 2022-06-27 | End: 2022-06-27

## 2022-06-27 RX ORDER — FENTANYL CITRATE 50 UG/ML
25 INJECTION, SOLUTION INTRAMUSCULAR; INTRAVENOUS
Status: DISCONTINUED | OUTPATIENT
Start: 2022-06-27 | End: 2022-06-27 | Stop reason: HOSPADM

## 2022-06-27 RX ORDER — ONDANSETRON 2 MG/ML
INJECTION INTRAMUSCULAR; INTRAVENOUS PRN
Status: DISCONTINUED | OUTPATIENT
Start: 2022-06-27 | End: 2022-06-27

## 2022-06-27 RX ORDER — LIDOCAINE HYDROCHLORIDE 20 MG/ML
INJECTION, SOLUTION INFILTRATION; PERINEURAL PRN
Status: DISCONTINUED | OUTPATIENT
Start: 2022-06-27 | End: 2022-06-27

## 2022-06-27 RX ORDER — DEXMEDETOMIDINE HYDROCHLORIDE 4 UG/ML
INJECTION, SOLUTION INTRAVENOUS PRN
Status: DISCONTINUED | OUTPATIENT
Start: 2022-06-27 | End: 2022-06-27

## 2022-06-27 RX ORDER — KETOROLAC TROMETHAMINE 30 MG/ML
INJECTION, SOLUTION INTRAMUSCULAR; INTRAVENOUS PRN
Status: DISCONTINUED | OUTPATIENT
Start: 2022-06-27 | End: 2022-06-27

## 2022-06-27 RX ORDER — ACETAMINOPHEN 325 MG/1
975 TABLET ORAL ONCE
Status: COMPLETED | OUTPATIENT
Start: 2022-06-27 | End: 2022-06-27

## 2022-06-27 RX ORDER — ACETAMINOPHEN 325 MG/1
650 TABLET ORAL EVERY 4 HOURS PRN
Qty: 50 TABLET | Refills: 0 | Status: SHIPPED | OUTPATIENT
Start: 2022-06-27

## 2022-06-27 RX ORDER — DIMENHYDRINATE 50 MG/ML
25 INJECTION, SOLUTION INTRAMUSCULAR; INTRAVENOUS
Status: DISCONTINUED | OUTPATIENT
Start: 2022-06-27 | End: 2022-06-27 | Stop reason: HOSPADM

## 2022-06-27 RX ORDER — CEFAZOLIN SODIUM/WATER 2 G/20 ML
2 SYRINGE (ML) INTRAVENOUS
Status: COMPLETED | OUTPATIENT
Start: 2022-06-27 | End: 2022-06-27

## 2022-06-27 RX ORDER — FENTANYL CITRATE 50 UG/ML
25 INJECTION, SOLUTION INTRAMUSCULAR; INTRAVENOUS EVERY 5 MIN PRN
Status: DISCONTINUED | OUTPATIENT
Start: 2022-06-27 | End: 2022-06-27 | Stop reason: HOSPADM

## 2022-06-27 RX ORDER — FENTANYL CITRATE 50 UG/ML
INJECTION, SOLUTION INTRAMUSCULAR; INTRAVENOUS PRN
Status: DISCONTINUED | OUTPATIENT
Start: 2022-06-27 | End: 2022-06-27

## 2022-06-27 RX ORDER — OXYCODONE HYDROCHLORIDE 5 MG/1
5 TABLET ORAL EVERY 4 HOURS PRN
Qty: 10 TABLET | Refills: 0 | Status: SHIPPED | OUTPATIENT
Start: 2022-06-27

## 2022-06-27 RX ORDER — CEFAZOLIN SODIUM/WATER 2 G/20 ML
2 SYRINGE (ML) INTRAVENOUS SEE ADMIN INSTRUCTIONS
Status: DISCONTINUED | OUTPATIENT
Start: 2022-06-27 | End: 2022-06-27 | Stop reason: HOSPADM

## 2022-06-27 RX ORDER — ACETAMINOPHEN 325 MG/1
650 TABLET ORAL
Status: DISCONTINUED | OUTPATIENT
Start: 2022-06-27 | End: 2022-06-27 | Stop reason: HOSPADM

## 2022-06-27 RX ORDER — HYDROMORPHONE HYDROCHLORIDE 1 MG/ML
0.2 INJECTION, SOLUTION INTRAMUSCULAR; INTRAVENOUS; SUBCUTANEOUS EVERY 5 MIN PRN
Status: DISCONTINUED | OUTPATIENT
Start: 2022-06-27 | End: 2022-06-27 | Stop reason: HOSPADM

## 2022-06-27 RX ORDER — SODIUM CHLORIDE, SODIUM LACTATE, POTASSIUM CHLORIDE, CALCIUM CHLORIDE 600; 310; 30; 20 MG/100ML; MG/100ML; MG/100ML; MG/100ML
INJECTION, SOLUTION INTRAVENOUS CONTINUOUS
Status: DISCONTINUED | OUTPATIENT
Start: 2022-06-27 | End: 2022-06-27 | Stop reason: HOSPADM

## 2022-06-27 RX ORDER — SODIUM CHLORIDE, SODIUM LACTATE, POTASSIUM CHLORIDE, CALCIUM CHLORIDE 600; 310; 30; 20 MG/100ML; MG/100ML; MG/100ML; MG/100ML
INJECTION, SOLUTION INTRAVENOUS CONTINUOUS PRN
Status: DISCONTINUED | OUTPATIENT
Start: 2022-06-27 | End: 2022-06-27

## 2022-06-27 RX ORDER — ONDANSETRON 4 MG/1
4 TABLET, ORALLY DISINTEGRATING ORAL EVERY 30 MIN PRN
Status: DISCONTINUED | OUTPATIENT
Start: 2022-06-27 | End: 2022-06-27 | Stop reason: HOSPADM

## 2022-06-27 RX ADMIN — PROPOFOL 200 MG: 10 INJECTION, EMULSION INTRAVENOUS at 15:44

## 2022-06-27 RX ADMIN — Medication 2 G: at 15:40

## 2022-06-27 RX ADMIN — LIDOCAINE HYDROCHLORIDE 80 MG: 20 INJECTION, SOLUTION INFILTRATION; PERINEURAL at 15:44

## 2022-06-27 RX ADMIN — MIDAZOLAM 2 MG: 1 INJECTION INTRAMUSCULAR; INTRAVENOUS at 15:34

## 2022-06-27 RX ADMIN — PROPOFOL 50 MG: 10 INJECTION, EMULSION INTRAVENOUS at 15:55

## 2022-06-27 RX ADMIN — DEXMEDETOMIDINE 10 MCG: 100 INJECTION, SOLUTION, CONCENTRATE INTRAVENOUS at 16:21

## 2022-06-27 RX ADMIN — DEXAMETHASONE SODIUM PHOSPHATE 8 MG: 4 INJECTION, SOLUTION INTRAMUSCULAR; INTRAVENOUS at 15:56

## 2022-06-27 RX ADMIN — FENTANYL CITRATE 25 MCG: 50 INJECTION, SOLUTION INTRAMUSCULAR; INTRAVENOUS at 16:07

## 2022-06-27 RX ADMIN — SODIUM CHLORIDE, POTASSIUM CHLORIDE, SODIUM LACTATE AND CALCIUM CHLORIDE: 600; 310; 30; 20 INJECTION, SOLUTION INTRAVENOUS at 15:34

## 2022-06-27 RX ADMIN — KETOROLAC TROMETHAMINE 15 MG: 30 INJECTION, SOLUTION INTRAMUSCULAR at 16:21

## 2022-06-27 RX ADMIN — HYDROMORPHONE HYDROCHLORIDE 0.5 MG: 1 INJECTION, SOLUTION INTRAMUSCULAR; INTRAVENOUS; SUBCUTANEOUS at 16:08

## 2022-06-27 RX ADMIN — FENTANYL CITRATE 25 MCG: 50 INJECTION, SOLUTION INTRAMUSCULAR; INTRAVENOUS at 15:53

## 2022-06-27 RX ADMIN — FENTANYL CITRATE 50 MCG: 50 INJECTION, SOLUTION INTRAMUSCULAR; INTRAVENOUS at 15:44

## 2022-06-27 RX ADMIN — ONDANSETRON 4 MG: 2 INJECTION INTRAMUSCULAR; INTRAVENOUS at 16:06

## 2022-06-27 RX ADMIN — ONDANSETRON 4 MG: 2 INJECTION INTRAMUSCULAR; INTRAVENOUS at 17:45

## 2022-06-27 RX ADMIN — PROPOFOL 30 MCG/KG/MIN: 10 INJECTION, EMULSION INTRAVENOUS at 15:56

## 2022-06-27 RX ADMIN — ACETAMINOPHEN 975 MG: 325 TABLET ORAL at 13:50

## 2022-06-27 NOTE — ANESTHESIA CARE TRANSFER NOTE
Patient: Mireille Yin    Procedure: Procedure(s):  Resection right upper arm mass       Diagnosis: Soft tissue mass [M79.89]  Diagnosis Additional Information: No value filed.    Anesthesia Type:   General     Note:    Oropharynx: oropharynx clear of all foreign objects  Level of Consciousness: awake  Oxygen Supplementation: face mask  Level of Supplemental Oxygen (L/min / FiO2): 6  Independent Airway: airway patency satisfactory and stable  Dentition: dentition unchanged  Vital Signs Stable: post-procedure vital signs reviewed and stable  Report to RN Given: handoff report given  Patient transferred to: PACU    Handoff Report: Identifed the Patient, Identified the Reponsible Provider, Reviewed the pertinent medical history, Discussed the surgical course, Reviewed Intra-OP anesthesia mangement and issues during anesthesia, Set expectations for post-procedure period and Allowed opportunity for questions and acknowledgement of understanding      Vitals:  Vitals Value Taken Time   /80 06/27/22 1639   Temp 36.6C    Pulse 84 06/27/22 1642   Resp 13 06/27/22 1642   SpO2 100 % 06/27/22 1642   Vitals shown include unvalidated device data.    Electronically Signed By: KODY Garcia CRNA  June 27, 2022  4:43 PM

## 2022-06-27 NOTE — DISCHARGE INSTRUCTIONS
Caring for Your Incision    You ll need to care for your incision after surgery and certain medical procedures. To close an incision, your doctor used sutures (stitches), steri-strips, staples, or dermabond. Follow the tips on this sheet to help heal and prevent infection of your incision.   Types of Incision Closure:  Surgical Sutures (stitches) are placed by sewing the edges of an incision together with surgical thread. Sutures are either absorbable or non-absorbable. Absorbable sutures break down in the body over time. Non-absorbable sutures need to be removed.   Steri-strips are made of adhesive (sticky) material to help hold the edges of an incision together. Steri-strips usually fall off by themselves in 7 to 10 days.   Surgical Staples are made or steel or titanium. They are often used to close shallow incisions. They are not used on certain body areas, such as the face and hands. This is because these areas have nerves that are close to the surface. Staples are usually removed within a week.   Dermabond (skin glue) is used to close a cut or small incision. The skin glue is less painful than stitches (sutures). In some cases, a lower layer of skin may be sutured before Dermabond is applied. The skin glue closes the cut/incision within a few minutes. It also provides a water-resistant covering. No bandage is required. Dermabond peels off on its own within 5 to 10 days.  Home Care for Your  Incision:  Keep the incision clean and dry. You should bathe only as directed by the doctor. It is okay to wash around the incision, but don t spray water directly on it.   Check the incision site daily for pain, redness, drainage, swelling, or separation of the incision edges.   If you have a dressing over the incision, change the dressing as directed by the doctor.  Make sure any clothing that touches the incision is loose fitting. This will prevent rubbing. If the incision is on the head, avoid wearing caps or other head  coverings. These may rub against the incision.  Avoid rough play, contact sports, or physical activities. This can put you at risk of opening an incision.   As your incision heals, the skin may appear pink or red. It may also feel slightly bumpy or raised. This is called a healing ridge. Over time, the color should fade and the raised skin will become less noticeable.   Call the doctor right away if you have any of the following:  Increased pain, redness, drainage, swelling or bleeding at the incision site  Numbness, coldness or tingling around the incision site  Fever of 101 F degrees or higher  Rev. 4/2014 Same-Day Surgery   Adult Discharge Orders & Instructions     For 24 hours after surgery:  Get plenty of rest.  A responsible adult must stay with you for at least 24 hours after you leave the hospital.   Pain medication can slow your reflexes. Do not drive or use heavy equipment.  If you have weakness or tingling, don't drive or use heavy equipment until this feeling goes away.  Mixing alcohol and pain medication can cause dizziness and slow your breathing. It can even be fatal. Do not drink alcohol while taking pain medication.  Avoid strenuous or risky activities.  Ask for help when climbing stairs.   You may feel lightheaded.  If so, sit for a few minutes before standing.  Have someone help you get up.   If you have nausea (feel sick to your stomach), drink only clear liquids such as apple juice, ginger ale, broth or 7-Up.  Rest may also help.  Be sure to drink enough fluids.  Move to a regular diet as you feel able. Take pain medications with a small amount of solid food, such as toast or crackers, to avoid nausea.   A slight fever is normal. Call the doctor if your fever is over 100 F (37.7 C) (taken under the tongue) or lasts longer than 24 hours.  You may have a dry mouth, muscle aches, trouble sleeping or a sore throat.  These symptoms should go away after 24 hours.  Do not make important or legal  decisions.   Pain Management:      1. Take pain medication (if prescribed) for pain as directed by your physician.        2. WARNING: If the pain medication you have been prescribed contains Tylenol  (acetaminophen), DO NOT take additional doses of Tylenol (acetaminophen).     Call your doctor for any of the followin.  Signs of infection (fever, growing tenderness at the surgery site, severe pain, a large amount of drainage or bleeding, foul-smelling drainage, redness, swelling).    2.  It has been over 8 to 10 hours since surgery and you are still not able to urinate (pee).    3.  Headache for over 24 hours.    4.  Numbness, tingling or weakness the day after surgery (if you had spinal anesthesia).  To contact a doctor, call Dr Brent Yu [facility] [phone]                                           or:  '   [phone] and ask for the Resident On Call for:          Orthopedics (answered 24 hours a day)  '   Emergency Department:  [facility]: [phone]  [facility]: [phone]               Rev. 10/2014

## 2022-06-27 NOTE — INTERVAL H&P NOTE
"I have reviewed the surgical (or preoperative) H&P that is linked to this encounter, and examined the patient. There are no significant changes    Clinical Conditions Present on Arrival:  Clinically Significant Risk Factors Present on Admission                   # Overweight: Estimated body mass index is 29.02 kg/m  as calculated from the following:    Height as of this encounter: 1.613 m (5' 3.5\").    Weight as of this encounter: 75.5 kg (166 lb 7.2 oz).       "

## 2022-06-27 NOTE — ANESTHESIA PROCEDURE NOTES
Airway       Patient location during procedure: OR  Staff -        CRNA: Timoteo Avila APRN CRNA       Performed By: CRNA  Consent for Airway        Urgency: elective  Indications and Patient Condition       Indications for airway management: tiesha-procedural       Induction type:intravenous       Mask difficulty assessment: 1 - vent by mask    Final Airway Details       Final airway type: supraglottic airway    Supraglottic Airway Details        Type: LMA       Brand: Air-Q       LMA size: 4.5    Post intubation assessment        Placement verified by: capnometry, equal breath sounds and chest rise        Number of attempts at approach: 1       Number of other approaches attempted: 0       Secured with: silk tape       Ease of procedure: easy       Dentition: Intact and Unchanged

## 2022-06-27 NOTE — ANESTHESIA POSTPROCEDURE EVALUATION
Patient: Mireille Yin    Procedure: Procedure(s):  Resection right upper arm mass       Anesthesia Type:  General    Note:  Disposition: Outpatient   Postop Pain Control: Uneventful            Sign Out: Well controlled pain   PONV: No   Neuro/Psych: Uneventful            Sign Out: Acceptable/Baseline neuro status   Airway/Respiratory: Uneventful            Sign Out: Acceptable/Baseline resp. status   CV/Hemodynamics: Uneventful            Sign Out: Acceptable CV status; No obvious hypovolemia; No obvious fluid overload   Other NRE: NONE   DID A NON-ROUTINE EVENT OCCUR? No           Last vitals:  Vitals Value Taken Time   /64 06/27/22 1700   Temp 36.6  C (97.9  F) 06/27/22 1639   Pulse 63 06/27/22 1703   Resp 12 06/27/22 1703   SpO2 98 % 06/27/22 1703   Vitals shown include unvalidated device data.    Electronically Signed By: Reginaldo Lopez DO  June 27, 2022  5:15 PM

## 2022-07-07 PROCEDURE — 88307 TISSUE EXAM BY PATHOLOGIST: CPT | Mod: 26 | Performed by: PATHOLOGY

## 2022-07-24 ENCOUNTER — HEALTH MAINTENANCE LETTER (OUTPATIENT)
Age: 32
End: 2022-07-24

## 2022-08-25 LAB — SPECIMEN STATUS: NORMAL

## 2022-09-01 ENCOUNTER — TELEPHONE (OUTPATIENT)
Dept: ORTHOPEDICS | Facility: CLINIC | Age: 32
End: 2022-09-01

## 2022-09-01 DIAGNOSIS — M79.89 SOFT TISSUE MASS: Primary | ICD-10-CM

## 2022-09-01 NOTE — TELEPHONE ENCOUNTER
RN called and left a voice message for Mireille.  Dr. Yu would like you to have a CT Chest.  I have placed the orders.  You may call any [place] facility and get scheduled for this.  If you want, I can coordinate same day with Dr. Gigi pan as well.  Just call me back if you want me to coordinate.

## 2022-09-08 ENCOUNTER — ANCILLARY PROCEDURE (OUTPATIENT)
Dept: CT IMAGING | Facility: CLINIC | Age: 32
End: 2022-09-08
Attending: ORTHOPAEDIC SURGERY
Payer: COMMERCIAL

## 2022-09-08 DIAGNOSIS — M79.89 SOFT TISSUE MASS: ICD-10-CM

## 2022-09-08 PROCEDURE — 71250 CT THORAX DX C-: CPT | Mod: GC | Performed by: RADIOLOGY

## 2022-09-14 ENCOUNTER — TELEPHONE (OUTPATIENT)
Dept: ORTHOPEDICS | Facility: CLINIC | Age: 32
End: 2022-09-14

## 2022-09-14 DIAGNOSIS — M79.89 SOFT TISSUE MASS: Primary | ICD-10-CM

## 2022-09-14 NOTE — TELEPHONE ENCOUNTER
Spoke at length with Mireille.  Recent Chest CT shows no concerning findings.  We discussed repeating this within 6 -12 mos.  Pt is currently pregnant, so we would recommend 12 months.  No MRI recommended of the arm, but if she feels a new bump, we should see her right away. 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 Her pathology genetic testing was denied, so she is going to appeal that. I agree we should get this additional testing, as the current pathology is non-specific.  If she needs help with the appeal, she will let us know.    If she feels anything in the arm, she will let us know.  We will see her virtually in 1 year to go over the Chest CT. She agrees with the plan and all questions have been answered.

## 2022-10-03 ENCOUNTER — HEALTH MAINTENANCE LETTER (OUTPATIENT)
Age: 32
End: 2022-10-03

## 2023-08-13 ENCOUNTER — HEALTH MAINTENANCE LETTER (OUTPATIENT)
Age: 33
End: 2023-08-13

## 2023-08-21 ENCOUNTER — ANCILLARY PROCEDURE (OUTPATIENT)
Dept: CT IMAGING | Facility: CLINIC | Age: 33
End: 2023-08-21
Attending: PHYSICIAN ASSISTANT
Payer: COMMERCIAL

## 2023-08-21 DIAGNOSIS — M79.89 SOFT TISSUE MASS: ICD-10-CM

## 2023-08-21 PROCEDURE — 71250 CT THORAX DX C-: CPT | Mod: GC | Performed by: RADIOLOGY

## 2023-08-23 DIAGNOSIS — M79.89 SOFT TISSUE MASS: Primary | ICD-10-CM

## 2023-09-19 ENCOUNTER — TELEPHONE (OUTPATIENT)
Dept: ORTHOPEDICS | Facility: CLINIC | Age: 33
End: 2023-09-19
Payer: COMMERCIAL

## 2023-09-19 NOTE — TELEPHONE ENCOUNTER
----- Message from Karin Cason PA-C sent at 9/18/2023 11:53 AM CDT -----  I ordered a chest CT for this patient for 6 months.  Can someone contact her and let her know that is the plan, and ask how her arm is doing?  If she has any concerns, we should examine her in the next week or two, if doing well, she should have a same day follow-up visit and chest CT appt in 6 months to examine her arm.  Raina    ----- Message -----  From: Brent Yu MD  Sent: 8/30/2023   9:34 AM CDT  To: Karin Cason PA-C    Yes.  I would like to examine her arm.  Its not clear that this was a malignancy.  ----- Message -----  From: Karin Cason PA-C  Sent: 8/23/2023  12:29 PM CDT  To: Brent Yu MD    So, are we no longer checking her arm and just checking her chest then?  Just confirming..  JF    ----- Message -----  From: Brent Yu MD  Sent: 8/23/2023   8:04 AM CDT  To: Karin Cason PA-C    The lung scans look good with no suspicious nodules so lets recheck another low-dose chest CT in 6 months.  If I expect we will do this for 2 years at this interval.  ----- Message -----  From: Karin Cason PA-C  Sent: 8/22/2023   9:45 AM CDT  To: Brent Yu MD    Please review the images and let me know the plan.  Thank you.

## 2023-09-19 NOTE — TELEPHONE ENCOUNTER
- A call was placed to the patient.     - Arm has no new lumps or pain. Pt comfortable with the 6 month follow up.     - Patient verbalized understanding of plan and all questions were answered. Call back number to clinic was given and patient was told to call if they had an further questions.

## 2023-12-15 DIAGNOSIS — M79.89 SOFT TISSUE MASS: Primary | ICD-10-CM

## 2024-03-15 ENCOUNTER — ANCILLARY PROCEDURE (OUTPATIENT)
Dept: MRI IMAGING | Facility: CLINIC | Age: 34
End: 2024-03-15
Attending: ORTHOPAEDIC SURGERY
Payer: COMMERCIAL

## 2024-03-15 DIAGNOSIS — M79.89 SOFT TISSUE MASS: ICD-10-CM

## 2024-03-15 PROCEDURE — 73218 MRI UPPER EXTREMITY W/O DYE: CPT | Mod: 26 | Performed by: RADIOLOGY

## 2024-03-15 PROCEDURE — 73218 MRI UPPER EXTREMITY W/O DYE: CPT | Mod: RT

## 2024-03-15 RX ORDER — GADOBUTROL 604.72 MG/ML
8 INJECTION INTRAVENOUS ONCE
Status: COMPLETED | OUTPATIENT
Start: 2024-03-15 | End: 2024-03-15

## 2024-03-18 ENCOUNTER — TELEPHONE (OUTPATIENT)
Dept: ORTHOPEDICS | Facility: CLINIC | Age: 34
End: 2024-03-18
Payer: COMMERCIAL

## 2024-03-18 NOTE — TELEPHONE ENCOUNTER
- A call was placed to the patient to schedule a follow up appointment with Dr. Yu. Patient did not answer phone so a voicemail was left.     - Call back number to clinic was given and patient was told to call back to get scheduled for their appointment.

## 2024-03-28 ENCOUNTER — ANCILLARY PROCEDURE (OUTPATIENT)
Dept: CT IMAGING | Facility: CLINIC | Age: 34
End: 2024-03-28
Attending: PHYSICIAN ASSISTANT
Payer: COMMERCIAL

## 2024-03-28 PROCEDURE — 71250 CT THORAX DX C-: CPT | Performed by: RADIOLOGY

## 2024-04-03 ENCOUNTER — OFFICE VISIT (OUTPATIENT)
Dept: ORTHOPEDICS | Facility: CLINIC | Age: 34
End: 2024-04-03
Payer: COMMERCIAL

## 2024-04-03 DIAGNOSIS — M79.89 SOFT TISSUE MASS: Primary | ICD-10-CM

## 2024-04-03 PROCEDURE — 99213 OFFICE O/P EST LOW 20 MIN: CPT | Performed by: ORTHOPAEDIC SURGERY

## 2024-04-03 NOTE — LETTER
4/3/2024         RE: Mireille Yin  [address] [facility] [address]        Dear Colleague,    Thank you for referring your patient, Mireille Yin, to the [facility] [place]. Please see a copy of my visit note below.    This patient is 22 months out from excision of a right arm mass.  The mass was indeterminate pathology both in diagnosis and whether or not it was malignant.  This patient has some family history of malignancy and for this reason is wanted to pursue this.    This patient is alert oriented has normal mood and affect and is in no acute distress respirations are regular unlabored and eyes are nonicteric.  In the right upper extremity there is no sign of masses and she has a full motion of the shoulder elbow wrist and hand.    MRI and CT scan today show no sign of local recurrence or metastatic disease.    This patient had a tumor of uncertain origin.  We do not have the actual name for it and would not even certain if it was cancerous but we have now observe this for 2 years.  Because it is superficial I think a local recurrence would be easily detected by the patient and she will keep an eye on it.  Because of her family history it is possible she may be more susceptible to tumors and so I do not think we should be doing CT scans of the chest unless she has a clear malignancy.  If the patient has some concerns would be willing to do a x-ray of the chest.  We will follow-up with her as needed.  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  If she notices anything in the arm she will let us know.  I have answered all of her questions today        Brent Yu MD

## 2024-04-03 NOTE — NURSING NOTE
Reason For Visit:   Chief Complaint   Patient presents with    RECHECK     Follow up right upper arm. Review MRI and CT        33 year old  1990      There were no vitals taken for this visit.    Pain Assessment  Patient Currently in Pain: Asim Tran, ATC

## 2024-04-03 NOTE — PROGRESS NOTES
This patient is 22 months out from excision of a right arm mass.  The mass was indeterminate pathology both in diagnosis and whether or not it was malignant.  This patient has some family history of malignancy and for this reason is wanted to pursue this.    This patient is alert oriented has normal mood and affect and is in no acute distress respirations are regular unlabored and eyes are nonicteric.  In the right upper extremity there is no sign of masses and she has a full motion of the shoulder elbow wrist and hand.    MRI and CT scan today show no sign of local recurrence or metastatic disease.    This patient had a tumor of uncertain origin.  We do not have the actual name for it and would not even certain if it was cancerous but we have now observe this for 2 years.  Because it is superficial I think a local recurrence would be easily detected by the patient and she will keep an eye on it.  Because of her family history it is possible she may be more susceptible to tumors and so I do not think we should be doing CT scans of the chest unless she has a clear malignancy.  If the patient has some concerns would be willing to do a x-ray of the chest.  We will follow-up with her as needed.  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  If she notices anything in the arm she will let us know.  I have answered all of her questions today

## 2024-10-06 ENCOUNTER — HEALTH MAINTENANCE LETTER (OUTPATIENT)
Age: 34
End: 2024-10-06

## (undated) DEVICE — PREP POVIDONE-IODINE 7.5% SCRUB 4OZ BOTTLE MDS093945

## (undated) DEVICE — GLOVE PROTEXIS W/NEU-THERA 7.0  2D73TE70

## (undated) DEVICE — GOWN XLG DISP 9545

## (undated) DEVICE — DRSG TELFA 3X8" 1238

## (undated) DEVICE — SOL NACL 0.9% IRRIG 1000ML BOTTLE 2F7124

## (undated) DEVICE — SU SILK 2-0 SH 30" K833H

## (undated) DEVICE — Device

## (undated) DEVICE — LINEN BACK PACK 5440

## (undated) DEVICE — SU MONOCRYL 4-0 PS-2 18" UND Y496G

## (undated) DEVICE — SU VICRYL 2-0 SH 27" UND J417H

## (undated) DEVICE — LIGHT HANDLE X2

## (undated) DEVICE — PREP DURAPREP REMOVER 4OZ 8611

## (undated) DEVICE — DRAPE STOCKINETTE IMPERVIOUS 12" 1587

## (undated) DEVICE — ESU PENCIL W/SMOKE EVAC NEPTUNE STRYKER 0703-046-000

## (undated) DEVICE — ESU GROUND PAD UNIVERSAL W/O CORD

## (undated) DEVICE — DRSG STERI STRIP 1/2X4" R1547

## (undated) DEVICE — SOL WATER IRRIG 1000ML BOTTLE 2F7114

## (undated) DEVICE — DRSG AQUACEL AG HYDROFIBER 3.5X6" 422604

## (undated) DEVICE — PREP DURAPREP 26ML APL 8630

## (undated) DEVICE — STRAP KNEE/BODY 31143004

## (undated) DEVICE — SU VICRYL 0 CT-2 27" J334H

## (undated) DEVICE — SUCTION MANIFOLD NEPTUNE 2 SYS 4 PORT 0702-020-000

## (undated) DEVICE — DRAPE CONVERTORS U-DRAPE 60X72" 8476

## (undated) RX ORDER — FENTANYL CITRATE 50 UG/ML
INJECTION, SOLUTION INTRAMUSCULAR; INTRAVENOUS
Status: DISPENSED
Start: 2022-06-27

## (undated) RX ORDER — HYDROMORPHONE HYDROCHLORIDE 1 MG/ML
INJECTION, SOLUTION INTRAMUSCULAR; INTRAVENOUS; SUBCUTANEOUS
Status: DISPENSED
Start: 2022-06-27

## (undated) RX ORDER — LIDOCAINE HYDROCHLORIDE 20 MG/ML
INJECTION, SOLUTION EPIDURAL; INFILTRATION; INTRACAUDAL; PERINEURAL
Status: DISPENSED
Start: 2022-06-27

## (undated) RX ORDER — ONDANSETRON 2 MG/ML
INJECTION INTRAMUSCULAR; INTRAVENOUS
Status: DISPENSED
Start: 2022-06-27

## (undated) RX ORDER — ACETAMINOPHEN 325 MG/1
TABLET ORAL
Status: DISPENSED
Start: 2022-06-27

## (undated) RX ORDER — FENTANYL CITRATE-0.9 % NACL/PF 10 MCG/ML
PLASTIC BAG, INJECTION (ML) INTRAVENOUS
Status: DISPENSED
Start: 2022-06-27

## (undated) RX ORDER — PROPOFOL 10 MG/ML
INJECTION, EMULSION INTRAVENOUS
Status: DISPENSED
Start: 2022-06-27

## (undated) RX ORDER — BUPIVACAINE HYDROCHLORIDE 2.5 MG/ML
INJECTION, SOLUTION EPIDURAL; INFILTRATION; INTRACAUDAL
Status: DISPENSED
Start: 2022-06-27

## (undated) RX ORDER — CEFAZOLIN SODIUM/WATER 2 G/20 ML
SYRINGE (ML) INTRAVENOUS
Status: DISPENSED
Start: 2022-06-27

## (undated) RX ORDER — DEXAMETHASONE SODIUM PHOSPHATE 4 MG/ML
INJECTION, SOLUTION INTRA-ARTICULAR; INTRALESIONAL; INTRAMUSCULAR; INTRAVENOUS; SOFT TISSUE
Status: DISPENSED
Start: 2022-06-27

## (undated) RX ORDER — EPHEDRINE SULFATE 50 MG/ML
INJECTION, SOLUTION INTRAMUSCULAR; INTRAVENOUS; SUBCUTANEOUS
Status: DISPENSED
Start: 2022-06-27

## (undated) RX ORDER — LIDOCAINE HYDROCHLORIDE 10 MG/ML
INJECTION, SOLUTION INFILTRATION; PERINEURAL
Status: DISPENSED
Start: 2022-06-03

## (undated) RX ORDER — GLYCOPYRROLATE 0.2 MG/ML
INJECTION INTRAMUSCULAR; INTRAVENOUS
Status: DISPENSED
Start: 2022-06-27